# Patient Record
Sex: MALE | Race: WHITE | NOT HISPANIC OR LATINO | URBAN - METROPOLITAN AREA
[De-identification: names, ages, dates, MRNs, and addresses within clinical notes are randomized per-mention and may not be internally consistent; named-entity substitution may affect disease eponyms.]

---

## 2017-12-06 ENCOUNTER — EMERGENCY (EMERGENCY)
Facility: HOSPITAL | Age: 52
LOS: 0 days | Discharge: HOME | End: 2017-12-06
Attending: EMERGENCY MEDICINE | Admitting: EMERGENCY MEDICINE
Payer: COMMERCIAL

## 2017-12-06 VITALS
HEART RATE: 91 BPM | OXYGEN SATURATION: 98 % | DIASTOLIC BLOOD PRESSURE: 80 MMHG | SYSTOLIC BLOOD PRESSURE: 135 MMHG | TEMPERATURE: 98 F | RESPIRATION RATE: 17 BRPM

## 2017-12-06 VITALS — WEIGHT: 250 LBS | HEIGHT: 72 IN

## 2017-12-06 DIAGNOSIS — S81.012A LACERATION WITHOUT FOREIGN BODY, LEFT KNEE, INITIAL ENCOUNTER: ICD-10-CM
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
## 2017-12-06 DIAGNOSIS — S39.92XA UNSPECIFIED INJURY OF LOWER BACK, INITIAL ENCOUNTER: ICD-10-CM

## 2017-12-06 PROCEDURE — 12002 RPR S/N/AX/GEN/TRNK2.6-7.5CM: CPT

## 2017-12-06 PROCEDURE — 99283 EMERGENCY DEPT VISIT LOW MDM: CPT

## 2017-12-06 RX ORDER — TETANUS TOXOID, REDUCED DIPHTHERIA TOXOID AND ACELLULAR PERTUSSIS VACCINE, ADSORBED 5; 2.5; 8; 8; 2.5 [IU]/.5ML; [IU]/.5ML; UG/.5ML; UG/.5ML; UG/.5ML
0.5 SUSPENSION INTRAMUSCULAR ONCE
Qty: 0 | Refills: 0 | Status: COMPLETED | OUTPATIENT
Start: 2017-12-06 | End: 2017-12-06

## 2017-12-06 RX ORDER — CEPHALEXIN 500 MG
500 CAPSULE ORAL
Qty: 0 | Refills: 0 | Status: DISCONTINUED | OUTPATIENT
Start: 2017-12-06 | End: 2017-12-06

## 2017-12-06 RX ORDER — CEPHALEXIN 500 MG
1 CAPSULE ORAL
Qty: 30 | Refills: 0 | OUTPATIENT
Start: 2017-12-06 | End: 2017-12-16

## 2017-12-06 RX ADMIN — Medication 500 MILLIGRAM(S): at 12:06

## 2017-12-06 RX ADMIN — TETANUS TOXOID, REDUCED DIPHTHERIA TOXOID AND ACELLULAR PERTUSSIS VACCINE, ADSORBED 0.5 MILLILITER(S): 5; 2.5; 8; 8; 2.5 SUSPENSION INTRAMUSCULAR at 12:04

## 2017-12-06 NOTE — ED STATDOCS - ATTENDING CONTRIBUTION TO CARE
I, Walker Snyder MD,  performed the initial face to face bedside interview with this patient regarding history of present illness, review of symptoms and relevant past medical, social and family history.  I completed an independent physical examination.  I was the initial provider who evaluated this patient. I have signed out the follow up of any pending tests (i.e. labs, radiological studies) to the ACP.  I have communicated the patient’s plan of care and disposition with the ACP.

## 2017-12-06 NOTE — ED ADULT TRIAGE NOTE - CHIEF COMPLAINT QUOTE
Pt ambulatory to triage and states a "tree just fell on my back" and the chainsaw cut my left leg. Pt c/o back pain with opening in jeans and laceration to left leg, limping. Pt placed in wheelchair, trauma alert called, and sent to trauma room for evaluation. Skin warm dry pink and positive radial pulse. EMS Triage notified

## 2017-12-06 NOTE — ED STATDOCS - NS_ ATTENDINGSCRIBEDETAILS _ED_A_ED_FT
I, Walker Snyder MD,  performed the initial face to face bedside interview with this patient regarding history of present illness, review of symptoms and relevant past medical, social and family history.  I completed an independent physical examination.    The history, relevant review of systems, past medical and surgical history, medical decision making, and physical examination was documented by the scribe in my presence and I attest to the accuracy of the documentation.

## 2017-12-06 NOTE — ED ADULT NURSE NOTE - OBJECTIVE STATEMENT
Pt. to the ED C/O Laceration of the left knee area and right lower side back pain- pt. states he was cutting tree and chain saw flew into left knee- Pt. states tree hit the right side of the back- Denies hitting head and LOC- Pt. on baby aspirin- Hx. of HTN - Pt. is able to move legs, denies numbness and tingling- TA Alert cancelled by Physician in ED

## 2017-12-06 NOTE — ED STATDOCS - CARE PLAN
Principal Discharge DX:	Laceration of left lower extremity, initial encounter  Secondary Diagnosis:	Injury of back, initial encounter

## 2017-12-06 NOTE — ED STATDOCS - PROGRESS NOTE DETAILS
51 yo male presents with left thigh laceration s/p working with a chainsaw cutting a tree. Pt cut his leg and the tree that he cut down hit him in the back. Unknown last tdap. -Esvin Mathias PA-C

## 2017-12-06 NOTE — ED STATDOCS - OBJECTIVE STATEMENT
51 y/o M with PMHx of HTN presents to the ED c/o lac to left leg and back pain starting this morning. Pt was using a chainsaw to cut a tree when the pt lost control of the chainsaw and it struck him in the left leg. Pt then states that the tree he was cutting swung around and hit him in the back. The tree did not fall on top of the Pt. Pt is ambulatory. Tetanus shot unknown. Pt takes ASA. Denies LOC or head trauma. Denies CP or SOB. Trauma alert initially called and cleared by Dr. Perez.

## 2017-12-21 ENCOUNTER — EMERGENCY (EMERGENCY)
Facility: HOSPITAL | Age: 52
LOS: 0 days | Discharge: HOME | End: 2017-12-21
Attending: EMERGENCY MEDICINE | Admitting: EMERGENCY MEDICINE

## 2017-12-21 VITALS
RESPIRATION RATE: 18 BRPM | TEMPERATURE: 98 F | SYSTOLIC BLOOD PRESSURE: 140 MMHG | OXYGEN SATURATION: 100 % | DIASTOLIC BLOOD PRESSURE: 84 MMHG | HEART RATE: 64 BPM

## 2017-12-21 VITALS — WEIGHT: 240.08 LBS | HEIGHT: 71 IN
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
## 2017-12-21 DIAGNOSIS — S81.012D LACERATION WITHOUT FOREIGN BODY, LEFT KNEE, SUBSEQUENT ENCOUNTER: ICD-10-CM
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
## 2017-12-21 NOTE — ED STATDOCS - ATTENDING CONTRIBUTION TO CARE
I, Reyes Rashid, performed the initial face to face bedside interview with this patient regarding history of present illness, review of symptoms and relevant past medical, social and family history.  I completed an independent physical examination.  I was the initial provider who evaluated this patient. I have signed out the follow up of any pending tests (i.e. labs, radiological studies) to the ACP.  I have communicated the patient’s plan of care and disposition with the ACP.  The history, relevant review of systems, past medical and surgical history, medical decision making, and physical examination was documented by the scribe in my presence and I attest to the accuracy of the documentation.

## 2017-12-21 NOTE — ED STATDOCS - PROGRESS NOTE DETAILS
ABDOUL Hoffman:   Patient has been seen, evaluated and orders have been written by the attending in intake. Patient is stable.  I will follow up the results of orders written and I will continue to evaluate/observe the patient.  To remove sutures.  Radha Hoffman PA-C

## 2017-12-21 NOTE — ED STATDOCS - OBJECTIVE STATEMENT
51 y/o M with PMHx of HTN presents to the ED for a suture removal that were put in 2 weeks ago. Pt sustained a laceration from a chainsaw 2 weeks ago. 1 suture fell out from the original 7 and a PA in an MD office was not able to get the rest of them. Pt denies fever or any other acute complaints at this time.

## 2018-08-04 ENCOUNTER — TRANSCRIPTION ENCOUNTER (OUTPATIENT)
Age: 53
End: 2018-08-04

## 2020-08-31 NOTE — ED ADULT NURSE NOTE - CARDIO ASSESSMENT
WDL Tetracycline Counseling: Patient counseled regarding possible photosensitivity and increased risk for sunburn.  Patient instructed to avoid sunlight, if possible.  When exposed to sunlight, patients should wear protective clothing, sunglasses, and sunscreen.  The patient was instructed to call the office immediately if the following severe adverse effects occur:  hearing changes, easy bruising/bleeding, severe headache, or vision changes.  The patient verbalized understanding of the proper use and possible adverse effects of tetracycline.  All of the patient's questions and concerns were addressed. Patient understands to avoid pregnancy while on therapy due to potential birth defects.

## 2022-05-11 ENCOUNTER — APPOINTMENT (OUTPATIENT)
Dept: ORTHOPEDIC SURGERY | Facility: CLINIC | Age: 57
End: 2022-05-11
Payer: COMMERCIAL

## 2022-05-11 VITALS — WEIGHT: 235 LBS | HEIGHT: 71 IN | BODY MASS INDEX: 32.9 KG/M2

## 2022-05-11 DIAGNOSIS — S83.92XA SPRAIN OF UNSPECIFIED SITE OF LEFT KNEE, INITIAL ENCOUNTER: ICD-10-CM

## 2022-05-11 DIAGNOSIS — Z86.79 PERSONAL HISTORY OF OTHER DISEASES OF THE CIRCULATORY SYSTEM: ICD-10-CM

## 2022-05-11 DIAGNOSIS — M17.12 UNILATERAL PRIMARY OSTEOARTHRITIS, LEFT KNEE: ICD-10-CM

## 2022-05-11 DIAGNOSIS — Z78.9 OTHER SPECIFIED HEALTH STATUS: ICD-10-CM

## 2022-05-11 PROBLEM — Z00.00 ENCOUNTER FOR PREVENTIVE HEALTH EXAMINATION: Status: ACTIVE | Noted: 2022-05-11

## 2022-05-11 PROBLEM — I10 ESSENTIAL (PRIMARY) HYPERTENSION: Chronic | Status: ACTIVE | Noted: 2017-12-06

## 2022-05-11 PROCEDURE — 99203 OFFICE O/P NEW LOW 30 MIN: CPT

## 2022-05-11 PROCEDURE — 73562 X-RAY EXAM OF KNEE 3: CPT | Mod: LT

## 2022-05-11 RX ORDER — FELODIPINE 10 MG/1
10 TABLET, EXTENDED RELEASE ORAL
Refills: 0 | Status: ACTIVE | COMMUNITY

## 2022-05-11 RX ORDER — VENLAFAXINE 37.5 MG/1
37.5 TABLET ORAL
Refills: 0 | Status: ACTIVE | COMMUNITY

## 2022-05-11 RX ORDER — DICLOFENAC SODIUM 75 MG/1
75 TABLET, DELAYED RELEASE ORAL
Qty: 60 | Refills: 2 | Status: ACTIVE | COMMUNITY
Start: 2022-05-11 | End: 1900-01-01

## 2022-05-11 RX ORDER — ROSUVASTATIN CALCIUM 10 MG/1
10 TABLET, FILM COATED ORAL
Refills: 0 | Status: ACTIVE | COMMUNITY

## 2022-05-11 NOTE — HISTORY OF PRESENT ILLNESS
[Gradual] : gradual [6] : 6 [Dull/Aching] : dull/aching [Tightness] : tightness [Intermittent] : intermittent [Leisure] : leisure [Sitting] : sitting [Full time] : Work status: full time [] : Post Surgical Visit: no [FreeTextEntry1] : L Knee  [de-identified] : President Rian Hurtado

## 2022-05-11 NOTE — PHYSICAL EXAM
[Left] : left knee [AP] : anteroposterior [Lateral] : lateral [Weems] : skyline [There are no fractures, subluxations or dislocations. No significant abnormalities are seen] : There are no fractures, subluxations or dislocations. No significant abnormalities are seen [Degenerative change] : Degenerative change [NL (0)] : extension 0 degrees [5___] : quadriceps 5[unfilled]/5 [] : patient ambulates without assistive device [TWNoteComboBox7] : flexion 135 degrees

## 2022-05-11 NOTE — REASON FOR VISIT
[FreeTextEntry2] : Patient  is a 56 year old male with complaints of L Knee pain x several weeks. Patient complains of stiffness and difficulty straightening. Pain when getting up from sitting long periods.

## 2022-05-26 ENCOUNTER — RESULT REVIEW (OUTPATIENT)
Age: 57
End: 2022-05-26

## 2022-06-01 ENCOUNTER — NON-APPOINTMENT (OUTPATIENT)
Age: 57
End: 2022-06-01

## 2022-06-01 DIAGNOSIS — M23.322 OTHER MENISCUS DERANGEMENTS, POSTERIOR HORN OF MEDIAL MENISCUS, LEFT KNEE: ICD-10-CM

## 2022-06-10 ENCOUNTER — APPOINTMENT (OUTPATIENT)
Dept: ORTHOPEDIC SURGERY | Facility: CLINIC | Age: 57
End: 2022-06-10

## 2023-09-01 NOTE — ED ADULT NURSE NOTE - IS THE PATIENT ABLE TO BE SCREENED?
The CT scan of your head looks good and the CT scan of your neck looks like you have straightening of the cervical spine which is usually due to muscle spasms of the muscles next to your spine pulling on the vertebra.  Continue taking the muscle relaxer as needed and I have written a prescription for Toradol.  Nieves Huitron DPT has provided you exercises to help with the pain.  Follow-up with your primary care provider next week if symptoms persist.   Yes

## 2025-01-23 ENCOUNTER — EMERGENCY (EMERGENCY)
Facility: HOSPITAL | Age: 60
LOS: 0 days | Discharge: HOME | End: 2025-01-23
Attending: EMERGENCY MEDICINE
Payer: COMMERCIAL

## 2025-01-23 VITALS
OXYGEN SATURATION: 99 % | RESPIRATION RATE: 16 BRPM | TEMPERATURE: 98 F | HEART RATE: 55 BPM | SYSTOLIC BLOOD PRESSURE: 173 MMHG | DIASTOLIC BLOOD PRESSURE: 91 MMHG

## 2025-01-23 VITALS — WEIGHT: 215.83 LBS | HEIGHT: 71 IN

## 2025-01-23 DIAGNOSIS — R19.7 DIARRHEA, UNSPECIFIED: ICD-10-CM

## 2025-01-23 DIAGNOSIS — I10 ESSENTIAL (PRIMARY) HYPERTENSION: ICD-10-CM

## 2025-01-23 DIAGNOSIS — R10.9 UNSPECIFIED ABDOMINAL PAIN: ICD-10-CM

## 2025-01-23 DIAGNOSIS — K52.9 NONINFECTIVE GASTROENTERITIS AND COLITIS, UNSPECIFIED: ICD-10-CM

## 2025-01-23 DIAGNOSIS — R11.10 VOMITING, UNSPECIFIED: ICD-10-CM

## 2025-01-23 DIAGNOSIS — Z79.82 LONG TERM (CURRENT) USE OF ASPIRIN: ICD-10-CM

## 2025-01-23 LAB
ABO RH CONFIRMATION: SIGNIFICANT CHANGE UP
ALBUMIN SERPL ELPH-MCNC: 4 G/DL — SIGNIFICANT CHANGE UP (ref 3.3–5)
ALP SERPL-CCNC: 51 U/L — SIGNIFICANT CHANGE UP (ref 40–120)
ALT FLD-CCNC: 25 U/L — SIGNIFICANT CHANGE UP (ref 12–78)
ANION GAP SERPL CALC-SCNC: 4 MMOL/L — LOW (ref 5–17)
APPEARANCE UR: CLEAR — SIGNIFICANT CHANGE UP
APTT BLD: 30.3 SEC — SIGNIFICANT CHANGE UP (ref 24.5–35.6)
AST SERPL-CCNC: 28 U/L — SIGNIFICANT CHANGE UP (ref 15–37)
BASOPHILS # BLD AUTO: 0.08 K/UL — SIGNIFICANT CHANGE UP (ref 0–0.2)
BASOPHILS NFR BLD AUTO: 1.1 % — SIGNIFICANT CHANGE UP (ref 0–2)
BILIRUB SERPL-MCNC: 0.6 MG/DL — SIGNIFICANT CHANGE UP (ref 0.2–1.2)
BILIRUB UR-MCNC: NEGATIVE — SIGNIFICANT CHANGE UP
BLD GP AB SCN SERPL QL: SIGNIFICANT CHANGE UP
BUN SERPL-MCNC: 13 MG/DL — SIGNIFICANT CHANGE UP (ref 7–23)
CALCIUM SERPL-MCNC: 9.1 MG/DL — SIGNIFICANT CHANGE UP (ref 8.5–10.1)
CHLORIDE SERPL-SCNC: 108 MMOL/L — SIGNIFICANT CHANGE UP (ref 96–108)
CO2 SERPL-SCNC: 29 MMOL/L — SIGNIFICANT CHANGE UP (ref 22–31)
COLOR SPEC: YELLOW — SIGNIFICANT CHANGE UP
CREAT SERPL-MCNC: 0.9 MG/DL — SIGNIFICANT CHANGE UP (ref 0.5–1.3)
DIFF PNL FLD: NEGATIVE — SIGNIFICANT CHANGE UP
EGFR: 97 ML/MIN/1.73M2 — SIGNIFICANT CHANGE UP
EOSINOPHIL # BLD AUTO: 0.01 K/UL — SIGNIFICANT CHANGE UP (ref 0–0.5)
EOSINOPHIL NFR BLD AUTO: 0.1 % — SIGNIFICANT CHANGE UP (ref 0–6)
GLUCOSE SERPL-MCNC: 107 MG/DL — HIGH (ref 70–99)
GLUCOSE UR QL: NEGATIVE MG/DL — SIGNIFICANT CHANGE UP
HCT VFR BLD CALC: 42.2 % — SIGNIFICANT CHANGE UP (ref 39–50)
HGB BLD-MCNC: 13.8 G/DL — SIGNIFICANT CHANGE UP (ref 13–17)
IMM GRANULOCYTES NFR BLD AUTO: 0.1 % — SIGNIFICANT CHANGE UP (ref 0–0.9)
INR BLD: 1.06 RATIO — SIGNIFICANT CHANGE UP (ref 0.85–1.16)
KETONES UR-MCNC: ABNORMAL MG/DL
LACTATE SERPL-SCNC: 1 MMOL/L — SIGNIFICANT CHANGE UP (ref 0.7–2)
LEUKOCYTE ESTERASE UR-ACNC: NEGATIVE — SIGNIFICANT CHANGE UP
LIDOCAIN IGE QN: 27 U/L — SIGNIFICANT CHANGE UP (ref 13–75)
LYMPHOCYTES # BLD AUTO: 1.65 K/UL — SIGNIFICANT CHANGE UP (ref 1–3.3)
LYMPHOCYTES # BLD AUTO: 22.4 % — SIGNIFICANT CHANGE UP (ref 13–44)
MCHC RBC-ENTMCNC: 29 PG — SIGNIFICANT CHANGE UP (ref 27–34)
MCHC RBC-ENTMCNC: 32.7 G/DL — SIGNIFICANT CHANGE UP (ref 32–36)
MCV RBC AUTO: 88.7 FL — SIGNIFICANT CHANGE UP (ref 80–100)
MONOCYTES # BLD AUTO: 0.76 K/UL — SIGNIFICANT CHANGE UP (ref 0–0.9)
MONOCYTES NFR BLD AUTO: 10.3 % — SIGNIFICANT CHANGE UP (ref 2–14)
NEUTROPHILS # BLD AUTO: 4.85 K/UL — SIGNIFICANT CHANGE UP (ref 1.8–7.4)
NEUTROPHILS NFR BLD AUTO: 66 % — SIGNIFICANT CHANGE UP (ref 43–77)
NITRITE UR-MCNC: NEGATIVE — SIGNIFICANT CHANGE UP
PH UR: 5.5 — SIGNIFICANT CHANGE UP (ref 5–8)
PLATELET # BLD AUTO: 260 K/UL — SIGNIFICANT CHANGE UP (ref 150–400)
POTASSIUM SERPL-MCNC: 3.5 MMOL/L — SIGNIFICANT CHANGE UP (ref 3.5–5.3)
POTASSIUM SERPL-SCNC: 3.5 MMOL/L — SIGNIFICANT CHANGE UP (ref 3.5–5.3)
PROT SERPL-MCNC: 7.6 GM/DL — SIGNIFICANT CHANGE UP (ref 6–8.3)
PROT UR-MCNC: SIGNIFICANT CHANGE UP MG/DL
PROTHROM AB SERPL-ACNC: 12.5 SEC — SIGNIFICANT CHANGE UP (ref 9.9–13.4)
RBC # BLD: 4.76 M/UL — SIGNIFICANT CHANGE UP (ref 4.2–5.8)
RBC # FLD: 13.6 % — SIGNIFICANT CHANGE UP (ref 10.3–14.5)
SODIUM SERPL-SCNC: 141 MMOL/L — SIGNIFICANT CHANGE UP (ref 135–145)
SP GR SPEC: 1.02 — SIGNIFICANT CHANGE UP (ref 1–1.03)
UROBILINOGEN FLD QL: 0.2 MG/DL — SIGNIFICANT CHANGE UP (ref 0.2–1)
WBC # BLD: 7.36 K/UL — SIGNIFICANT CHANGE UP (ref 3.8–10.5)
WBC # FLD AUTO: 7.36 K/UL — SIGNIFICANT CHANGE UP (ref 3.8–10.5)

## 2025-01-23 PROCEDURE — 86900 BLOOD TYPING SEROLOGIC ABO: CPT

## 2025-01-23 PROCEDURE — 83690 ASSAY OF LIPASE: CPT

## 2025-01-23 PROCEDURE — 36415 COLL VENOUS BLD VENIPUNCTURE: CPT

## 2025-01-23 PROCEDURE — 81003 URINALYSIS AUTO W/O SCOPE: CPT

## 2025-01-23 PROCEDURE — 86850 RBC ANTIBODY SCREEN: CPT

## 2025-01-23 PROCEDURE — 85730 THROMBOPLASTIN TIME PARTIAL: CPT

## 2025-01-23 PROCEDURE — 83605 ASSAY OF LACTIC ACID: CPT

## 2025-01-23 PROCEDURE — 74177 CT ABD & PELVIS W/CONTRAST: CPT | Mod: MC

## 2025-01-23 PROCEDURE — 36000 PLACE NEEDLE IN VEIN: CPT | Mod: XU

## 2025-01-23 PROCEDURE — 85025 COMPLETE CBC W/AUTO DIFF WBC: CPT

## 2025-01-23 PROCEDURE — 80053 COMPREHEN METABOLIC PANEL: CPT

## 2025-01-23 PROCEDURE — 74177 CT ABD & PELVIS W/CONTRAST: CPT | Mod: 26

## 2025-01-23 PROCEDURE — 86901 BLOOD TYPING SEROLOGIC RH(D): CPT

## 2025-01-23 PROCEDURE — 85610 PROTHROMBIN TIME: CPT

## 2025-01-23 PROCEDURE — 99285 EMERGENCY DEPT VISIT HI MDM: CPT

## 2025-01-23 PROCEDURE — 99284 EMERGENCY DEPT VISIT MOD MDM: CPT | Mod: 25

## 2025-01-23 RX ORDER — SODIUM CHLORIDE 9 MG/ML
1000 INJECTION, SOLUTION INTRAMUSCULAR; INTRAVENOUS; SUBCUTANEOUS ONCE
Refills: 0 | Status: COMPLETED | OUTPATIENT
Start: 2025-01-23 | End: 2025-01-23

## 2025-01-23 RX ADMIN — SODIUM CHLORIDE 1000 MILLILITER(S): 9 INJECTION, SOLUTION INTRAMUSCULAR; INTRAVENOUS; SUBCUTANEOUS at 11:00

## 2025-01-23 NOTE — ED STATDOCS - SKIN, MLM
skin normal color for race, warm, dry and intact. laceration to R face, steri strips in place, no bleeding, no redness skin normal color for race, warm, dry and intact.

## 2025-01-23 NOTE — ED STATDOCS - PATIENT PORTAL LINK FT
You can access the FollowMyHealth Patient Portal offered by [facility] by registering at the following website: [email or web address]. By joining [facility]’s FollowMyHealth portal, you will also be able to view your health information using other applications (apps) compatible with our system.

## 2025-01-23 NOTE — ED ADULT TRIAGE NOTE - CHIEF COMPLAINT QUOTE
patient c/o abdominal discomfort, episode of BRBPR this morning.  reports abdominal cramping and one episode of vomiting yesterday.  woke up around 3 AM and had one bloody BM.  on baby ASA.

## 2025-01-23 NOTE — ED STATDOCS - OBJECTIVE STATEMENT
59 year-old male with a PMHx of HTN presents to see a plastic surgeon because pt sustained a  laceration  to R face yesterday, is requesting plastics. Was told to return this morning to see plastics. Denies pain, bleeding controlled. No other complaints at this time. 59  year-old male with a PMHx of HTN presents complaining of abdominal pain. 59  year-old male with a PMHx of HTN on baby aspirin presents complaining of difuse abdominal pain. Reports abdominal cramping and one episode of vomiting yesterday, as well as 2 episodes of bloody stools. Reports that his last colonoscopy was 3y ago. No other complaints at this time.

## 2025-01-23 NOTE — ED STATDOCS - PROGRESS NOTE DETAILS
58 y/o M with PMH of HTN presents with vomiting yesterday and multiple episodes of diarrhea since yesterday. Reports seeing bloody stools at home. Notes he had viral URI apx 2 weeks ago. Denies fever, chills. Last colonoscopy was 3 years ago. Denies fever, chills. PE: Well appearing. Cardiac: s1s2, RRR. lungs; CTAB. Abdomen: NBS x4 soft, nontender. A/P: r/o diverticulitis, plan for labs, CT, reassess. - Benji Quinones PA-C Labs and imaging reviewed with patient. +colitis on CT, nonspecific. Likely viral in etiology. Encouraged GI follow up for further assessment. - Benji Quinones PA-C

## 2025-01-23 NOTE — ED STATDOCS - ATTENDING APP SHARED VISIT CONTRIBUTION OF CARE
I,Vasu Foster MD,  performed the initial face to face bedside interview with this patient regarding history of present illness, review of symptoms and relevant past medical, social and family history.  I completed an independent physical examination.  I was the initial provider who evaluated this patient. I have signed out the follow up of any pending tests (i.e. labs, radiological studies) to the ACP.  I have communicated the patient’s plan of care and disposition with the ACP.  The history, relevant review of systems, past medical and surgical history, medical decision making, and physical examination was documented by the scribe in my presence and I attest to the accuracy of the documentation.

## 2025-01-23 NOTE — ED STATDOCS - CLINICAL SUMMARY MEDICAL DECISION MAKING FREE TEXT BOX
Pt here to have plastic surgery repair laceration. Will consult. Pt c/o episode of abdominal pain yesterday, reports 2 episodes of bloody stool, currently feels well, no blood thinners. Last colonoscopy 3y ago. Will order labs and  CT, monitor, observe, reassess.

## 2025-05-14 ENCOUNTER — APPOINTMENT (OUTPATIENT)
Dept: PULMONOLOGY | Facility: CLINIC | Age: 60
End: 2025-05-14
Payer: COMMERCIAL

## 2025-05-14 VITALS
BODY MASS INDEX: 30.24 KG/M2 | RESPIRATION RATE: 16 BRPM | HEIGHT: 71 IN | OXYGEN SATURATION: 98 % | WEIGHT: 216 LBS | SYSTOLIC BLOOD PRESSURE: 114 MMHG | HEART RATE: 59 BPM | DIASTOLIC BLOOD PRESSURE: 66 MMHG

## 2025-05-14 DIAGNOSIS — G47.33 OBSTRUCTIVE SLEEP APNEA (ADULT) (PEDIATRIC): ICD-10-CM

## 2025-05-14 PROCEDURE — 99203 OFFICE O/P NEW LOW 30 MIN: CPT

## 2025-05-14 RX ORDER — LISINOPRIL 30 MG/1
TABLET ORAL
Refills: 0 | Status: ACTIVE | COMMUNITY

## 2025-06-28 ENCOUNTER — OUTPATIENT (OUTPATIENT)
Dept: OUTPATIENT SERVICES | Facility: HOSPITAL | Age: 60
LOS: 1 days | End: 2025-06-28
Payer: COMMERCIAL

## 2025-06-28 DIAGNOSIS — G47.33 OBSTRUCTIVE SLEEP APNEA (ADULT) (PEDIATRIC): ICD-10-CM

## 2025-06-28 PROCEDURE — 95800 SLP STDY UNATTENDED: CPT

## 2025-06-28 PROCEDURE — 95800 SLP STDY UNATTENDED: CPT | Mod: 26

## 2025-09-18 ENCOUNTER — APPOINTMENT (OUTPATIENT)
Dept: PULMONOLOGY | Facility: CLINIC | Age: 60
End: 2025-09-18

## 2025-09-18 VITALS — BODY MASS INDEX: 30.78 KG/M2 | WEIGHT: 215 LBS | HEIGHT: 70 IN | RESPIRATION RATE: 16 BRPM

## 2025-09-18 VITALS — SYSTOLIC BLOOD PRESSURE: 118 MMHG | DIASTOLIC BLOOD PRESSURE: 72 MMHG | HEART RATE: 67 BPM | OXYGEN SATURATION: 98 %

## 2025-09-18 DIAGNOSIS — Z78.9 OTHER SPECIFIED HEALTH STATUS: ICD-10-CM

## 2025-09-18 DIAGNOSIS — G47.19 OTHER HYPERSOMNIA: ICD-10-CM

## 2025-09-18 DIAGNOSIS — R06.83 SNORING: ICD-10-CM

## 2025-09-18 DIAGNOSIS — F45.8 OTHER SOMATOFORM DISORDERS: ICD-10-CM
